# Patient Record
Sex: MALE | Race: ASIAN | NOT HISPANIC OR LATINO | URBAN - METROPOLITAN AREA
[De-identification: names, ages, dates, MRNs, and addresses within clinical notes are randomized per-mention and may not be internally consistent; named-entity substitution may affect disease eponyms.]

---

## 2020-11-22 ENCOUNTER — EMERGENCY (EMERGENCY)
Facility: HOSPITAL | Age: 50
LOS: 1 days | Discharge: HOME | End: 2020-11-22
Attending: EMERGENCY MEDICINE | Admitting: EMERGENCY MEDICINE
Payer: MEDICAID

## 2020-11-22 VITALS
DIASTOLIC BLOOD PRESSURE: 70 MMHG | TEMPERATURE: 99 F | RESPIRATION RATE: 19 BRPM | SYSTOLIC BLOOD PRESSURE: 130 MMHG | HEART RATE: 64 BPM

## 2020-11-22 VITALS
RESPIRATION RATE: 16 BRPM | TEMPERATURE: 99 F | OXYGEN SATURATION: 98 % | DIASTOLIC BLOOD PRESSURE: 70 MMHG | HEART RATE: 63 BPM | SYSTOLIC BLOOD PRESSURE: 139 MMHG | HEIGHT: 71 IN | WEIGHT: 145.06 LBS

## 2020-11-22 PROCEDURE — 90715 TDAP VACCINE 7 YRS/> IM: CPT

## 2020-11-22 PROCEDURE — 99283 EMERGENCY DEPT VISIT LOW MDM: CPT | Mod: 25

## 2020-11-22 PROCEDURE — 99283 EMERGENCY DEPT VISIT LOW MDM: CPT

## 2020-11-22 PROCEDURE — 90471 IMMUNIZATION ADMIN: CPT

## 2020-11-22 RX ORDER — TETANUS TOXOID, REDUCED DIPHTHERIA TOXOID AND ACELLULAR PERTUSSIS VACCINE, ADSORBED 5; 2.5; 8; 8; 2.5 [IU]/.5ML; [IU]/.5ML; UG/.5ML; UG/.5ML; UG/.5ML
0.5 SUSPENSION INTRAMUSCULAR ONCE
Refills: 0 | Status: COMPLETED | OUTPATIENT
Start: 2020-11-22 | End: 2020-11-22

## 2020-11-22 RX ADMIN — TETANUS TOXOID, REDUCED DIPHTHERIA TOXOID AND ACELLULAR PERTUSSIS VACCINE, ADSORBED 0.5 MILLILITER(S): 5; 2.5; 8; 8; 2.5 SUSPENSION INTRAMUSCULAR at 17:58

## 2020-11-22 RX ADMIN — Medication 1 TABLET(S): at 17:58

## 2020-11-22 NOTE — ED PROVIDER NOTE - PROGRESS NOTE DETAILS
Anthonyibjacquelyn AS for Dr. Piedra: 50 year old male presents to the ED for CAT scratch on his left hand x today. Pt states that he was holding his friend's CAT and was scratched by it. Unknown if the cat's vaccines are UTD. Unknown if his tetanus is UTD. [facility]  ID #[id number] pt aware of need to have friends vet obs cat for 10 days

## 2020-11-22 NOTE — ED PROVIDER NOTE - PATIENT PORTAL LINK FT
You can access the FollowMyHealth Patient Portal offered by [facility] [place] by registering at the following website: [email or web address]. By joining [facility]’s FollowMyHealth portal, you will also be able to view your health information using other applications (apps) compatible with our system.

## 2020-11-22 NOTE — ED ADULT NURSE NOTE - OBJECTIVE STATEMENT
Amb to ED. Pt was scratched on palm of his left hand by his friend's kitten. Superfical markings noted. Denies motor or sensory deficit.

## 2020-11-22 NOTE — ED PROVIDER NOTE - SKIN, MLM
Skin normal color for race, warm, dry. +superficial abrasions on palm of left hand, no surrounding erythema or discharge

## 2020-11-22 NOTE — ED PROVIDER NOTE - OBJECTIVE STATEMENT
50 year old male presents to the ED for CAT scratch on his left hand x today. Pt states that he was holding his friend's CAT and was scratched by it. Unknown if the cat's vaccines are UTD. Unknown if his tetanus is UTD. No other complaints. Former smoker. PCP: Amarjit. [facility]  ID #[id number].